# Patient Record
Sex: FEMALE | Race: WHITE | NOT HISPANIC OR LATINO | Employment: FULL TIME | URBAN - NONMETROPOLITAN AREA
[De-identification: names, ages, dates, MRNs, and addresses within clinical notes are randomized per-mention and may not be internally consistent; named-entity substitution may affect disease eponyms.]

---

## 2023-05-01 DIAGNOSIS — R10.9 UNSPECIFIED ABDOMINAL PAIN: ICD-10-CM

## 2023-05-01 RX ORDER — NALOXONE HYDROCHLORIDE 4 MG/.1ML
4 SPRAY NASAL
COMMUNITY
Start: 2022-12-15 | End: 2024-05-22 | Stop reason: ALTCHOICE

## 2023-05-01 RX ORDER — OMEPRAZOLE 20 MG/1
CAPSULE, DELAYED RELEASE ORAL
Qty: 30 CAPSULE | Refills: 2 | Status: SHIPPED | OUTPATIENT
Start: 2023-05-01 | End: 2024-05-22 | Stop reason: ALTCHOICE

## 2023-05-01 RX ORDER — LEVONORGESTREL/ETHIN.ESTRADIOL 0.1-0.02MG
1 TABLET ORAL DAILY
COMMUNITY

## 2023-05-01 RX ORDER — FLUTICASONE PROPIONATE 50 MCG
SPRAY, SUSPENSION (ML) NASAL
COMMUNITY
End: 2024-05-22 | Stop reason: ALTCHOICE

## 2023-05-01 RX ORDER — LIDOCAINE HYDROCHLORIDE 20 MG/ML
SOLUTION ORAL; TOPICAL
COMMUNITY
Start: 2022-12-15 | End: 2024-05-22 | Stop reason: ALTCHOICE

## 2023-05-01 RX ORDER — OMEPRAZOLE 20 MG/1
20 CAPSULE, DELAYED RELEASE ORAL DAILY
COMMUNITY
End: 2024-05-22 | Stop reason: ALTCHOICE

## 2023-07-21 ENCOUNTER — APPOINTMENT (OUTPATIENT)
Dept: PRIMARY CARE | Facility: CLINIC | Age: 23
End: 2023-07-21
Payer: COMMERCIAL

## 2024-05-15 NOTE — PROGRESS NOTES
"May Richardson is a 23 y.o. female who presents for Annual Exam (Keeps getting horrible headaches that last 2-3 days in a row, nothing gives her relief, she's getting them weekly; would like tested for thyroid issues; grandmother recently dx with colon cancer, gr.grandmother also had it )    Migraines: Worse in the last 4 to 6 months. OTC meds are not helping: aspirin, tylenol, aleve, excedrine, and prescription strength NSAIDs. She is working with Dr. Whitten now, she got new glasses and HA got better for a week. Previously with 2 to 5 HA days per month. Now having about 6 to 8 HA days per month. When she gets HA they last a couple of days. No caffeine intake. Not taking ibuprofen for anything apart from HA.     Abnormal weight gain: No irregular periods. She has gained about 30lb since her surgery last year.     Fhx colon cancer: Great grandmother had colon cancer and her grandmother was also recently diagnosed with colon cancer. She had C-scope in 2021 that was negative, unsure when next due.      All other systems have been reviewed and are negative for complaint     Objective   /90 (BP Location: Left arm, Patient Position: Sitting, BP Cuff Size: Adult)   Pulse 77   Ht 1.626 m (5' 4\")   Wt 87.1 kg (192 lb)   BMI 32.96 kg/m²     Gen: No acute distress, alert and oriented x3, pleasant   HEENT: moist mucous membranes, b/l external auditory canals are clear of debris, TMs within normal limits, no oropharyngeal lesions, eomi, perrla   Neck: thyroid within normal limits, no lymphadenopathy   CV: RRR, normal S1/S2, no murmur   Resp: Clear to auscultation bilaterally, no wheezes or rhonchi appreciated  Abd: soft, nontender, non-distended, no guarding/rigidity, bowel sounds present  Extr: no edema, no calf tenderness  Derm: Skin is warm and dry, no rashes appreciated  Psych: mood is good, affect is congruent, good hygiene, normal speech and eye contact  Neuro: cranial nerves grossly intact, normal " gait    Assessment/Plan     #Migraines  #Abnormal weight gain  Check labs  Rx sent sumatriptan for pnr use  Nurtec samples given   Followup 6 weeks     HCM:  Last C-scope was 3y ago, Strong Fhx  Referring for C-scope today  Declining flu shot

## 2024-05-22 ENCOUNTER — OFFICE VISIT (OUTPATIENT)
Dept: PRIMARY CARE | Facility: CLINIC | Age: 24
End: 2024-05-22
Payer: MEDICARE

## 2024-05-22 ENCOUNTER — LAB (OUTPATIENT)
Dept: LAB | Facility: LAB | Age: 24
End: 2024-05-22
Payer: MEDICARE

## 2024-05-22 VITALS
HEIGHT: 64 IN | DIASTOLIC BLOOD PRESSURE: 90 MMHG | WEIGHT: 192 LBS | HEART RATE: 77 BPM | SYSTOLIC BLOOD PRESSURE: 130 MMHG | BODY MASS INDEX: 32.78 KG/M2

## 2024-05-22 DIAGNOSIS — G43.809 OTHER MIGRAINE WITHOUT STATUS MIGRAINOSUS, NOT INTRACTABLE: ICD-10-CM

## 2024-05-22 DIAGNOSIS — R63.5 ABNORMAL WEIGHT GAIN: ICD-10-CM

## 2024-05-22 DIAGNOSIS — R63.5 ABNORMAL WEIGHT GAIN: Primary | ICD-10-CM

## 2024-05-22 LAB
ALBUMIN SERPL BCP-MCNC: 4.5 G/DL (ref 3.4–5)
ALP SERPL-CCNC: 67 U/L (ref 33–110)
ALT SERPL W P-5'-P-CCNC: 28 U/L (ref 7–45)
ANION GAP SERPL CALC-SCNC: 11 MMOL/L (ref 10–20)
AST SERPL W P-5'-P-CCNC: 23 U/L (ref 9–39)
BASOPHILS # BLD AUTO: 0.02 X10*3/UL (ref 0–0.1)
BASOPHILS NFR BLD AUTO: 0.2 %
BILIRUB SERPL-MCNC: 0.4 MG/DL (ref 0–1.2)
BUN SERPL-MCNC: 12 MG/DL (ref 6–23)
CALCIUM SERPL-MCNC: 9.2 MG/DL (ref 8.6–10.3)
CHLORIDE SERPL-SCNC: 104 MMOL/L (ref 98–107)
CO2 SERPL-SCNC: 26 MMOL/L (ref 21–32)
CREAT SERPL-MCNC: 0.79 MG/DL (ref 0.5–1.05)
EGFRCR SERPLBLD CKD-EPI 2021: >90 ML/MIN/1.73M*2
EOSINOPHIL # BLD AUTO: 0.07 X10*3/UL (ref 0–0.7)
EOSINOPHIL NFR BLD AUTO: 0.7 %
ERYTHROCYTE [DISTWIDTH] IN BLOOD BY AUTOMATED COUNT: 12.3 % (ref 11.5–14.5)
EST. AVERAGE GLUCOSE BLD GHB EST-MCNC: 105 MG/DL
FSH SERPL-ACNC: 1.8 IU/L
GLUCOSE SERPL-MCNC: 85 MG/DL (ref 74–99)
HBA1C MFR BLD: 5.3 %
HCT VFR BLD AUTO: 41.4 % (ref 36–46)
HGB BLD-MCNC: 13.9 G/DL (ref 12–16)
IMM GRANULOCYTES # BLD AUTO: 0.03 X10*3/UL (ref 0–0.7)
IMM GRANULOCYTES NFR BLD AUTO: 0.3 % (ref 0–0.9)
LH SERPL-ACNC: 1.2 IU/L
LYMPHOCYTES # BLD AUTO: 1.84 X10*3/UL (ref 1.2–4.8)
LYMPHOCYTES NFR BLD AUTO: 18.4 %
MCH RBC QN AUTO: 31.9 PG (ref 26–34)
MCHC RBC AUTO-ENTMCNC: 33.6 G/DL (ref 32–36)
MCV RBC AUTO: 95 FL (ref 80–100)
MONOCYTES # BLD AUTO: 0.51 X10*3/UL (ref 0.1–1)
MONOCYTES NFR BLD AUTO: 5.1 %
NEUTROPHILS # BLD AUTO: 7.51 X10*3/UL (ref 1.2–7.7)
NEUTROPHILS NFR BLD AUTO: 75.3 %
NRBC BLD-RTO: 0 /100 WBCS (ref 0–0)
PLATELET # BLD AUTO: 319 X10*3/UL (ref 150–450)
POTASSIUM SERPL-SCNC: 3.7 MMOL/L (ref 3.5–5.3)
PROT SERPL-MCNC: 7.9 G/DL (ref 6.4–8.2)
RBC # BLD AUTO: 4.36 X10*6/UL (ref 4–5.2)
SODIUM SERPL-SCNC: 137 MMOL/L (ref 136–145)
TSH SERPL-ACNC: 3.4 MIU/L (ref 0.44–3.98)
WBC # BLD AUTO: 10 X10*3/UL (ref 4.4–11.3)

## 2024-05-22 PROCEDURE — 83001 ASSAY OF GONADOTROPIN (FSH): CPT

## 2024-05-22 PROCEDURE — 83036 HEMOGLOBIN GLYCOSYLATED A1C: CPT

## 2024-05-22 PROCEDURE — 83002 ASSAY OF GONADOTROPIN (LH): CPT

## 2024-05-22 PROCEDURE — 80053 COMPREHEN METABOLIC PANEL: CPT

## 2024-05-22 PROCEDURE — 85025 COMPLETE CBC W/AUTO DIFF WBC: CPT

## 2024-05-22 PROCEDURE — 36415 COLL VENOUS BLD VENIPUNCTURE: CPT

## 2024-05-22 PROCEDURE — 99214 OFFICE O/P EST MOD 30 MIN: CPT | Performed by: FAMILY MEDICINE

## 2024-05-22 PROCEDURE — 84443 ASSAY THYROID STIM HORMONE: CPT

## 2024-05-22 RX ORDER — SUMATRIPTAN SUCCINATE 25 MG/1
25 TABLET ORAL ONCE AS NEEDED
Qty: 9 TABLET | Refills: 1 | Status: SHIPPED | OUTPATIENT
Start: 2024-05-22

## 2024-05-22 NOTE — PATIENT INSTRUCTIONS
Gastroenterology:  Juan A Rivera ([facility]) [phone]  Juan A Oliveira ([facility]) [phone]

## 2024-06-07 DIAGNOSIS — G43.809 OTHER MIGRAINE WITHOUT STATUS MIGRAINOSUS, NOT INTRACTABLE: Primary | ICD-10-CM

## 2024-06-07 DIAGNOSIS — H54.7 VISION LOSS: ICD-10-CM

## 2024-06-24 NOTE — PROGRESS NOTES
"Subjective   Patient ID: May Richardson is a 24 y.o. female who presents for Follow-up (6 weeks- migraines and weight gain- sumatriptan not helpful, she gets hot and flushed about 20 minutes after taking, did not try Nurtec; stopped taking OCP).    Migraines: Worse in the last 4 to 6 months. OTC meds are not helping: aspirin, tylenol, aleve, excedrine, and prescription strength NSAIDs. She is working with Dr. Whitten now, she got new glasses and HA got better for a week. Previously with 2 to 5 HA days per month. Now having about 6 to 8 HA days per month. When she gets HA they last a couple of days. No caffeine intake. Not taking ibuprofen for anything apart from HA.     She tried sumatriptan out numerous times but it caused hot flashes, sweating, redness without any improvement in the headaches. Never tried nurtec due to concerns about her insurance. Completed MRI brain.      Abnormal weight gain: No irregular periods. She has gained about 30lb since her surgery last year.      Fhx colon cancer: Great grandmother had colon cancer and her grandmother was also recently diagnosed with colon cancer. She had C-scope in 2021 that was negative, unsure when next due.      All other systems have been reviewed and are negative for complaint     Objective   /78 (BP Location: Left arm, Patient Position: Sitting, BP Cuff Size: Adult)   Pulse 75   Ht 1.626 m (5' 4\")   Wt 89.8 kg (198 lb)   BMI 33.99 kg/m²     Gen: No acute distress, alert and oriented x3, pleasant   HEENT: moist mucous membranes, b/l external auditory canals are clear of debris, TMs within normal limits, no oropharyngeal lesions, eomi, perrla   Neck: thyroid within normal limits, no lymphadenopathy   CV: RRR, normal S1/S2, no murmur   Resp: Clear to auscultation bilaterally, no wheezes or rhonchi appreciated  Abd: soft, nontender, non-distended, no guarding/rigidity, bowel sounds present  Extr: no edema, no calf tenderness  Derm: Skin is warm and " dry, no rashes appreciated  Psych: mood is good, affect is congruent, good hygiene, normal speech and eye contact  Neuro: cranial nerves grossly intact, normal gait    Assessment/Plan   #Migraines  #Abnormal weight gain  Labs neg, MRI brain reinforces migraine dx  SE's on sumatriptan, trial rizatriptan  Nurte samples given   Rx sent topiramate  Followup 2 mo     HCM:  Last C-scope was 3y ago, Strong Fhx  Referring for C-scope today  Declining flu shot

## 2024-06-26 ENCOUNTER — HOSPITAL ENCOUNTER (OUTPATIENT)
Dept: RADIOLOGY | Facility: HOSPITAL | Age: 24
Discharge: HOME | End: 2024-06-26
Payer: MEDICARE

## 2024-06-26 DIAGNOSIS — G43.809 OTHER MIGRAINE WITHOUT STATUS MIGRAINOSUS, NOT INTRACTABLE: ICD-10-CM

## 2024-06-26 DIAGNOSIS — H54.7 VISION LOSS: ICD-10-CM

## 2024-06-26 PROCEDURE — 70551 MRI BRAIN STEM W/O DYE: CPT

## 2024-07-01 ENCOUNTER — APPOINTMENT (OUTPATIENT)
Dept: PRIMARY CARE | Facility: CLINIC | Age: 24
End: 2024-07-01
Payer: MEDICARE

## 2024-07-01 VITALS
SYSTOLIC BLOOD PRESSURE: 111 MMHG | HEIGHT: 64 IN | HEART RATE: 75 BPM | WEIGHT: 198 LBS | BODY MASS INDEX: 33.8 KG/M2 | DIASTOLIC BLOOD PRESSURE: 78 MMHG

## 2024-07-01 DIAGNOSIS — G43.809 OTHER MIGRAINE WITHOUT STATUS MIGRAINOSUS, NOT INTRACTABLE: Primary | ICD-10-CM

## 2024-07-01 PROCEDURE — 99214 OFFICE O/P EST MOD 30 MIN: CPT | Performed by: FAMILY MEDICINE

## 2024-07-01 PROCEDURE — 1036F TOBACCO NON-USER: CPT | Performed by: FAMILY MEDICINE

## 2024-07-01 RX ORDER — TOPIRAMATE 50 MG/1
50 TABLET, FILM COATED ORAL 2 TIMES DAILY
Qty: 60 TABLET | Refills: 5 | Status: SHIPPED | OUTPATIENT
Start: 2024-07-01 | End: 2024-12-28

## 2024-07-01 RX ORDER — RIZATRIPTAN BENZOATE 5 MG/1
5 TABLET ORAL ONCE AS NEEDED
Qty: 9 TABLET | Refills: 0 | Status: SHIPPED | OUTPATIENT
Start: 2024-07-01 | End: 2024-07-31

## 2024-07-12 ENCOUNTER — APPOINTMENT (OUTPATIENT)
Dept: OTOLARYNGOLOGY | Facility: CLINIC | Age: 24
End: 2024-07-12
Payer: MEDICARE

## 2024-07-15 ENCOUNTER — APPOINTMENT (OUTPATIENT)
Dept: OTOLARYNGOLOGY | Facility: CLINIC | Age: 24
End: 2024-07-15
Payer: MEDICARE

## 2024-07-15 DIAGNOSIS — J34.2 DEVIATED NASAL SEPTUM: Primary | ICD-10-CM

## 2024-07-15 DIAGNOSIS — J34.1 MAXILLARY SINUS CYST: ICD-10-CM

## 2024-07-15 DIAGNOSIS — J34.89 CONCHA BULLOSA: ICD-10-CM

## 2024-07-15 DIAGNOSIS — J34.3 NASAL TURBINATE HYPERTROPHY: ICD-10-CM

## 2024-07-15 PROCEDURE — 99214 OFFICE O/P EST MOD 30 MIN: CPT | Performed by: OTOLARYNGOLOGY

## 2024-07-15 RX ORDER — FLUTICASONE PROPIONATE 50 MCG
1 SPRAY, SUSPENSION (ML) NASAL 2 TIMES DAILY
Qty: 16 G | Refills: 1 | Status: SHIPPED | OUTPATIENT
Start: 2024-07-15 | End: 2025-07-15

## 2024-07-15 NOTE — PROGRESS NOTES
Diagnoses/Problems     · Postoperative examination (V67.00) (Z09)     Patient Discussion/Summary     01.17.2023: She had tonsillectomy on 12.15.2022. Comes for follow up. She is doing good, her recovery was fast. On examination she has well-healed partial tonsillectomy. There are three visible retained dissolvable sutures 2 on the right and 1 on the left side. I think they can come out on their own, no need to remove them.        Recommendation  1â€“follow-up as needed     ____________________________________________________________________________________________     May is a 23 yo F. She is referred by Dr. Price. For the past one year, she keeps getting sinus and throat infections. She has used multiple rounds of antibiotics and steroids. She missed lots of work days.      On examination, tonsils look big for age.  I have explained to her that she can go for surgery, or she could go for further testing for possible immune deficiency. She does not want to go for surgery as of now, and would like to research possible immunity related problems.     Plan:  1- Follow up with Dr. Price regarding already done blood work.  2- Referral to immunology for recurrent sore throat and sinus infections      Chief Complaint     follow up tonsillectomy      Adult Risk ScreeningAdult Risk Screening_[facility]:   Initial Fall Risk Screening: The patient is not using an assistive device.             History of Present Illness  7.15.24  ______________________________________________________________________    01.17.2023: She had tonsillectomy on 12.15.2022. Comes for follow up. She is doing good, her recovery was fast. On examination she has well-healed partial tonsillectomy. There are three visible retained dissolvable sutures 2 on the right and 1 on the left side. I think they can come out on their own, no need to remove them.        Recommendation  1â€“follow-up as needed      ____________________________________________________________________________________________     May is a 23 yo F. She is referred by Dr. Price. For the past one year, she keeps getting sinus and throat infections. She has used multiple rounds of antibiotics and steroids. She missed lots of work days.      On examination, tonsils look big for age.  I have explained to her that she can go for surgery, or she could go for further testing for possible immune deficiency. She does not want to go for surgery as of now, and would like to research possible immunity related problems.     Plan:  1- Follow up with Dr. Price regarding already done blood work.  2- Referral to immunology for recurrent sore throat and sinus infections      Review of Systems  Below is a copy of her initial ROS, she does not have fever today.           ENMT: postnasal drip.   Respiratory: coughing up sputum.        Physical Exam  (old exam note)  General appearance: Healthy-appearing, well-nourished, well groomed, in no acute distress.      Head and Face: Atraumatic with no masses, lesions, or scarring.      Salivary glands: No tenderness of the parotid glands or parotid masses.      No tenderness of the submandibular glands or submandibular masses.      Facial strength: Normal strength and symmetry, no synkinesis or facial tic.      Eyes: Conjunctivas look non-hyperemic bilaterally     Ears: Bilaterally ear canals look normal. Tympanic membranes intact, no hyperemia, fluid or retraction.      Nose: Mucosa looks normal. No purulent discharge.     Oral Cavity/Mouth: Lips and tongue look normal.      Throat: No postnasal discharge. Tonsil hypertrophy for age.      Neck: Symmetrical, trachea midline.      Pulmonary: Normal respiratory effort.      Lymphatic: No palpable pathologic lymph nodes at neck.      Neurological/Psychiatric: Orientation to person, place, and time: Normal.   Mood and affect: Normal.      Extremities: No clubbing.          Signatures   Electronically signed by : Juliana Martin MD; Jan 17 2023 10:01AM EST (Author)

## 2024-07-15 NOTE — PROGRESS NOTES
Chief Complaint     Sinus cyst     History of Present Illness    07.15.2024: She has difficulty with breathing out of left side of her nose. Her nose gets stuffy at night. She has postnasal discharge. She has headaches for the past 6 months. There is tenderness at right maxillary ethmoid sinuses. She had an MRI, incidentally a cyst was found close to the outflow tract of right maxillary sinus. 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 Enlarged inferior turbinates.    On examination, nasal septum is markedly deviated to left, narrowing left nasal cavity    Dx:  1- septum deviation  2- jere bullosa  3- Maxillary sinus cyst     Plan:  1- trial of fluticasone nasal spray  2- follow up in one month  3- consider septoplasty, reduction of right jere bullosa and removal of right maxillary sinus cyst.        _________________________________________________________________    01.17.2023: She had tonsillectomy on 12.15.2022. Comes for follow up. She is doing good, her recovery was fast. On examination she has well-healed partial tonsillectomy. There are three visible retained dissolvable sutures 2 on the right and 1 on the left side. I think they can come out on their own, no need to remove them.        Recommendation  1â€“follow-up as needed     ____________________________________________________________________________________________     May is a 21 yo F. She is referred by Dr. Price. For the past one year, she keeps getting sinus and throat infections. She has used multiple rounds of antibiotics and steroids. She missed lots of work days.      On examination, tonsils look big for age.  I have explained to her that she can go for surgery, or she could go for further testing for possible immune deficiency. She does not want to go for surgery as of now, and would like to research possible immunity related problems.     Plan:  1- Follow up with Dr. Price regarding already done  blood work.  2- Referral to immunology for recurrent sore throat and sinus infections      Review of Systems  Below is a copy of her initial ROS, she does not have fever today.           ENMT: postnasal drip.   Respiratory: coughing up sputum.   All other systems have been reviewed and are negative for complaint.      Active Problems     · Abdominal pain (789.00) (R10.9)   · Abnormal thyroid function test (794.5) (R94.6)   · Abnormal weight gain (783.1) (R63.5)   · Acute sinusitis, recurrence not specified, unspecified location (461.9) (J01.90)   · Bronchitis (490) (J40)   · Class 1 obesity with body mass index (BMI) of 30.0 to 30.9 in adult (278.00,V85.30)  (E66.9,Z68.30)   · COVID-19 (079.89) (U07.1)   · Diarrhea (787.91) (R19.7)   · Exertional chest pain (786.50) (R07.9)   · Exertional shortness of breath (786.05) (R06.02)   · Fatigue (780.79) (R53.83)   · Gastritis (535.50) (K29.70)   · Injury of left knee, initial encounter (959.7) (S89.92XA)   · Nausea in adult (787.02) (R11.0)   · Nonintractable headache, unspecified chronicity pattern, unspecified headache type  (784.0) (R51.9)   · Recurrent sinusitis (473.9) (J32.9)   · Recurrent tonsillitis (463) (J03.91)   · Screening for hyperlipidemia (V77.91) (Z13.220)   · Spasmodic torticollis (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.83) (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.3)   · Tonsillitis (463) (J03.90)   · Unexplained weight loss (783.21) (R63.4)   · Upper respiratory infection (465.9) (J06.9)   · Added by Problem List Migration; 2013-3-24; Moved to [facility] Nov 25 2013 9:34PM     Past Medical History     · History of Acute maxillary sinusitis (461.0) (J01.00)   · Resolved Date: 23 Mar 2016   · Acute tonsillitis (463) (J03.90)   · Resolved Date: 05 Aug 2013   · Acute tonsillitis (463) (J03.90)   · Resolved Date: 19 Aug 2013   · History of Cough (786.2) (R05.9)   · Resolved Date: 12 Jun 2015   · History of Fever of unknown origin (FUO) (780.60) (R50.9)   · Resolved Date: 23 Mar 2016   · History of backache (V13.59) (Z87.39)   ·  Resolved Date: 23 Mar 2016   · History of epistaxis (V12.69) (Z87.898)   · Resolved Date: 30 Jul 2015   · History of pharyngitis (V12.69) (Z87.09)   · Resolved Date: 12 Jun 2015   · Added by Problem List Migration; 2013-3-24; Moved to [facility] Nov 25 2013 9:34PM   · History of Nasal congestion (478.19) (R09.81)   · Resolved Date: 30 Jul 2015   · History of Sore throat (462) (J02.9)   · Resolved Date: 23 Mar 2016   · History of Vasovagal syncope (780.2) (R55)   · Resolved Date: 12 Jun 2015     Surgical History     · History of Umbilical Hernia Repair     Family History     · Family history of cardiac disorder (V17.49) (Z82.49)     · Family history of cardiac disorder (V17.49) (Z82.49)     Social History     · Never smoker   · No alcohol use     Allergies     · No Known Drug Allergies   Recorded By: Ally Montenegro; 5/18/2013 11:16:30 AM     · [place]rees   Recorded By: Liberty Larsen; 9/12/2022 11:40:02 AM     Current Meds     Medication Name Instruction   Fluticasone Propionate 50 MCG/ACT Nasal Suspension SPRAY 1 SPRAY INTO EACH NOSTRIL EVERY DAY   Mucinex 600 MG Oral Tablet Extended Release 12 Hour TAKE 1 TABLET EVERY 12 HOURS.   Omeprazole 20 MG Oral Capsule Delayed Release TAKE 1 CAPSULE BY MOUTH DAILY      Physical Exam  (old exam note)  General appearance: Healthy-appearing, well-nourished, well groomed, in no acute distress.      Head and Face: Atraumatic with no masses, lesions, or scarring.      Salivary glands: No tenderness of the parotid glands or parotid masses.      No tenderness of the submandibular glands or submandibular masses.      Facial strength: Normal strength and symmetry, no synkinesis or facial tic.      Eyes: Conjunctivas look non-hyperemic bilaterally     Ears: Bilaterally ear canals look normal. Tympanic membranes intact, no hyperemia, fluid or retraction.      Nose: Mucosa looks normal. No purulent discharge.     Oral Cavity/Mouth: Lips and tongue look normal.      Throat: No postnasal  discharge. Tonsil hypertrophy for age.      Neck: Symmetrical, trachea midline.      Pulmonary: Normal respiratory effort.      Lymphatic: No palpable pathologic lymph nodes at neck.      Neurological/Psychiatric: Orientation to person, place, and time: Normal.   Mood and affect: Normal.      Extremities: No clubbing.       Patient Discussion/Summary     07.15.2024: She has difficulty with breathing out of left side of her nose. Her nose gets stuffy at night. She has postnasal discharge. She has headaches for the past 6 months. There is tenderness at right maxillary ethmoid sinuses. She had an MRI, incidentally a cyst was found close to the outflow tract of right maxillary sinus. 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 Enlarged inferior turbinates.    On examination, nasal septum is markedly deviated to left, narrowing left nasal cavity    Dx:  1- septum deviation  2- jere bullosa  3- Maxillary sinus cyst     Plan:  1- trial of fluticasone nasal spray  2- follow up in one month  3- consider septoplasty, reduction of right jere bullosa and removal of right maxillary sinus cyst.  _________________________________________________________________    01.17.2023: She had tonsillectomy on 12.15.2022. Comes for follow up. She is doing good, her recovery was fast. On examination she has well-healed partial tonsillectomy. There are three visible retained dissolvable sutures 2 on the right and 1 on the left side. I think they can come out on their own, no need to remove them.        Recommendation  1â€“follow-up as needed     ____________________________________________________________________________________________     May is a 23 yo F. She is referred by Dr. Price. For the past one year, she keeps getting sinus and throat infections. She has used multiple rounds of antibiotics and steroids. She missed lots of work days.      On examination, tonsils look big for age.  I have explained  to her that she can go for surgery, or she could go for further testing for possible immune deficiency. She does not want to go for surgery as of now, and would like to research possible immunity related problems.     Plan:  1- Follow up with Dr. Price regarding already done blood work.  2- Referral to immunology for recurrent sore throat and sinus infections

## 2024-07-29 DIAGNOSIS — G43.809 OTHER MIGRAINE WITHOUT STATUS MIGRAINOSUS, NOT INTRACTABLE: ICD-10-CM

## 2024-07-29 RX ORDER — RIZATRIPTAN BENZOATE 5 MG/1
5 TABLET ORAL ONCE AS NEEDED
Qty: 9 TABLET | Refills: 0 | Status: SHIPPED | OUTPATIENT
Start: 2024-07-29 | End: 2024-08-28

## 2024-08-07 DIAGNOSIS — J34.2 DEVIATED NASAL SEPTUM: ICD-10-CM

## 2024-08-07 DIAGNOSIS — J34.3 NASAL TURBINATE HYPERTROPHY: ICD-10-CM

## 2024-08-07 DIAGNOSIS — J34.89 CONCHA BULLOSA: ICD-10-CM

## 2024-08-07 DIAGNOSIS — J34.1 MAXILLARY SINUS CYST: ICD-10-CM

## 2024-08-08 RX ORDER — FLUTICASONE PROPIONATE 50 MCG
SPRAY, SUSPENSION (ML) NASAL
Qty: 48 ML | Refills: 1 | Status: SHIPPED | OUTPATIENT
Start: 2024-08-08

## 2024-09-09 ENCOUNTER — LAB (OUTPATIENT)
Dept: LAB | Facility: LAB | Age: 24
End: 2024-09-09
Payer: MEDICARE

## 2024-09-09 ENCOUNTER — APPOINTMENT (OUTPATIENT)
Dept: PRIMARY CARE | Facility: CLINIC | Age: 24
End: 2024-09-09
Payer: MEDICARE

## 2024-09-09 VITALS
DIASTOLIC BLOOD PRESSURE: 86 MMHG | SYSTOLIC BLOOD PRESSURE: 123 MMHG | BODY MASS INDEX: 34.31 KG/M2 | WEIGHT: 201 LBS | HEIGHT: 64 IN | HEART RATE: 101 BPM

## 2024-09-09 DIAGNOSIS — R94.6 ABNORMAL THYROID FUNCTION TEST: ICD-10-CM

## 2024-09-09 DIAGNOSIS — G43.811 OTHER MIGRAINE WITH STATUS MIGRAINOSUS, INTRACTABLE: Primary | ICD-10-CM

## 2024-09-09 PROBLEM — J44.9 CHRONIC OBSTRUCTIVE PULMONARY DISEASE, UNSPECIFIED COPD TYPE (MULTI): Status: ACTIVE | Noted: 2024-09-09

## 2024-09-09 PROBLEM — E11.9 TYPE 2 DIABETES MELLITUS WITHOUT COMPLICATION, WITHOUT LONG-TERM CURRENT USE OF INSULIN (MULTI): Status: ACTIVE | Noted: 2024-09-09

## 2024-09-09 LAB
T4 FREE SERPL-MCNC: 0.71 NG/DL (ref 0.61–1.12)
TSH SERPL-ACNC: 5.02 MIU/L (ref 0.44–3.98)

## 2024-09-09 PROCEDURE — 3008F BODY MASS INDEX DOCD: CPT | Performed by: FAMILY MEDICINE

## 2024-09-09 PROCEDURE — 3074F SYST BP LT 130 MM HG: CPT | Performed by: FAMILY MEDICINE

## 2024-09-09 PROCEDURE — 84439 ASSAY OF FREE THYROXINE: CPT

## 2024-09-09 PROCEDURE — 84481 FREE ASSAY (FT-3): CPT

## 2024-09-09 PROCEDURE — 99214 OFFICE O/P EST MOD 30 MIN: CPT | Performed by: FAMILY MEDICINE

## 2024-09-09 PROCEDURE — 3044F HG A1C LEVEL LT 7.0%: CPT | Performed by: FAMILY MEDICINE

## 2024-09-09 PROCEDURE — 84443 ASSAY THYROID STIM HORMONE: CPT

## 2024-09-09 PROCEDURE — 1036F TOBACCO NON-USER: CPT | Performed by: FAMILY MEDICINE

## 2024-09-09 PROCEDURE — 86376 MICROSOMAL ANTIBODY EACH: CPT

## 2024-09-09 PROCEDURE — 3079F DIAST BP 80-89 MM HG: CPT | Performed by: FAMILY MEDICINE

## 2024-09-09 PROCEDURE — 36415 COLL VENOUS BLD VENIPUNCTURE: CPT

## 2024-09-09 RX ORDER — KETOROLAC TROMETHAMINE 10 MG/1
10 TABLET, FILM COATED ORAL DAILY
Qty: 5 TABLET | Refills: 0 | Status: SHIPPED | OUTPATIENT
Start: 2024-09-09 | End: 2024-10-09

## 2024-09-09 RX ORDER — AMITRIPTYLINE HYDROCHLORIDE 25 MG/1
25 TABLET, FILM COATED ORAL NIGHTLY
Qty: 30 TABLET | Refills: 11 | Status: SHIPPED | OUTPATIENT
Start: 2024-09-09 | End: 2025-09-09

## 2024-09-09 NOTE — PROGRESS NOTES
"Subjective   Patient ID: May Richardson is a 24 y.o. female who presents for Follow-up (2 months follow up for migraines; not getting them as often but the intensity is the same when she does get them ).    Migraines: Worse in the last 4 to 6 months. OTC meds are not helping: aspirin, tylenol, aleve, excedrine, and prescription strength NSAIDs. She is working with Dr. Whitten now, she got new glasses and HA got better for a week. Previously with 2 to 5 HA days per month. Now having about 6 to 8 HA days per month. When she gets HA they last a couple of days. No caffeine intake. Not taking ibuprofen for anything apart from HA. She tried sumatriptan out numerous times but it caused hot flashes, sweating, redness without any improvement in the headaches. Never tried nurtec due to concerns about her insurance. Completed MRI brain.     Update: HA are still lasting 4 to 5 days. She is having HA every 2 weeks. Mom has been wanting to take her to the ER intermittently because of the severity of the migraines. Currently having 15 to 20 HA days per month. She tried rizatriptan, it did not help. No SE's. She tried nurtec and it did not help either (no SE's). She has been taking topiramate, she has been dealing with some minimal tingling that has been tolerable.      Abnormal weight gain: No irregular periods. She has gained about 30lb since her surgery last year.      Fhx colon cancer: Great grandmother had colon cancer and her grandmother was also recently diagnosed with colon cancer. She had C-scope in 2021 that was negative, unsure when next due.      All other systems have been reviewed and are negative for complaint     Objective   /86 (BP Location: Left arm, Patient Position: Sitting, BP Cuff Size: Adult)   Pulse 101   Ht 1.626 m (5' 4\")   Wt 91.2 kg (201 lb)   BMI 34.50 kg/m²     Gen: No acute distress, alert and oriented x3, pleasant   HEENT: moist mucous membranes, b/l external auditory canals are clear " of debris, TMs within normal limits, no oropharyngeal lesions, eomi, perrla   Neck: thyroid within normal limits, no lymphadenopathy   CV: RRR, normal S1/S2, no murmur   Resp: Clear to auscultation bilaterally, no wheezes or rhonchi appreciated  Abd: soft, nontender, non-distended, no guarding/rigidity, bowel sounds present  Extr: no edema, no calf tenderness  Derm: Skin is warm and dry, no rashes appreciated  Psych: mood is good, affect is congruent, good hygiene, normal speech and eye contact  Neuro: cranial nerves grossly intact, normal gait    Assessment/Plan     #Migraines  #Abnormal weight gain  Labs neg, MRI brain reinforces migraine dx  SE's on sumatriptan, rizatriptan and nurtec did not help  Topiramate did not work  Trial amitriptyline  Rx sent amitriptyline  Rx sent toradol  Ubrelvy sample given  Followup 6 weeks     HCM:  Last C-scope was 3y ago, Strong Fhx  Referring for C-scope today  Declining flu shot

## 2024-09-10 DIAGNOSIS — E06.3 HASHIMOTO'S THYROIDITIS: ICD-10-CM

## 2024-09-10 DIAGNOSIS — E03.9 HYPOTHYROIDISM, UNSPECIFIED TYPE: Primary | ICD-10-CM

## 2024-09-10 LAB
T3FREE SERPL-MCNC: 3 PG/ML (ref 2.3–4.2)
THYROPEROXIDASE AB SERPL-ACNC: >1000 IU/ML

## 2024-09-10 RX ORDER — LEVOTHYROXINE SODIUM 50 UG/1
50 TABLET ORAL DAILY
Qty: 30 TABLET | Refills: 11 | Status: SHIPPED | OUTPATIENT
Start: 2024-09-10 | End: 2025-09-10

## 2024-09-19 ENCOUNTER — OFFICE VISIT (OUTPATIENT)
Dept: URGENT CARE | Facility: URGENT CARE | Age: 24
End: 2024-09-19
Payer: MEDICARE

## 2024-09-19 ENCOUNTER — HOSPITAL ENCOUNTER (OUTPATIENT)
Dept: RADIOLOGY | Facility: CLINIC | Age: 24
Discharge: HOME | End: 2024-09-19
Payer: MEDICARE

## 2024-09-19 VITALS
HEART RATE: 80 BPM | BODY MASS INDEX: 35.04 KG/M2 | SYSTOLIC BLOOD PRESSURE: 125 MMHG | DIASTOLIC BLOOD PRESSURE: 88 MMHG | TEMPERATURE: 97.4 F | RESPIRATION RATE: 18 BRPM | OXYGEN SATURATION: 100 % | WEIGHT: 204.15 LBS

## 2024-09-19 DIAGNOSIS — M25.572 ACUTE LEFT ANKLE PAIN: Primary | ICD-10-CM

## 2024-09-19 DIAGNOSIS — S93.492A SPRAIN OF OTHER LIGAMENT OF LEFT ANKLE, INITIAL ENCOUNTER: ICD-10-CM

## 2024-09-19 DIAGNOSIS — M25.572 ACUTE LEFT ANKLE PAIN: ICD-10-CM

## 2024-09-19 PROCEDURE — 73610 X-RAY EXAM OF ANKLE: CPT | Mod: LT

## 2024-09-19 NOTE — PROGRESS NOTES
Subjective   Patient ID: May Richardson is a 24 y.o. female. They present today with a chief complaint of Injury ((L) ankle, fell downstairs and rolled ankle 2hrs ago).    History of Present Illness  HPI  Pt reports accidentally falling down 3 steps inverting her left foot while leaving work around 5pm; she was trying to turn the lights out while going down the steps causing her to lose her balance. She felt immediate pain and popping noises in lateral left ankle. Pt able to walk however with difficulty. No numbness.  Past Medical History  Allergies as of 09/19/2024 - Reviewed 09/19/2024   Allergen Reaction Noted    Pollen extracts Unknown 05/22/2024       (Not in a hospital admission)       Past Medical History:   Diagnosis Date    Acute maxillary sinusitis, unspecified 01/28/2016    Acute maxillary sinusitis    Acute pharyngitis, unspecified 01/20/2016    Sore throat    Acute tonsillitis, unspecified 07/22/2013    Acute tonsillitis    Acute tonsillitis, unspecified 08/05/2013    Acute tonsillitis    Cough, unspecified 08/05/2013    Cough    Fever, unspecified 09/23/2015    Fever of unknown origin (FUO)    Hypothyroidism due to Hashimoto's thyroiditis     Migraine headache     Nasal congestion 06/12/2015    Nasal congestion    Personal history of other diseases of the musculoskeletal system and connective tissue 09/21/2015    History of backache    Personal history of other diseases of the respiratory system 01/03/2015    History of pharyngitis    Personal history of other specified conditions 06/12/2015    History of epistaxis    Syncope and collapse 06/12/2015    Vasovagal syncope       Past Surgical History:   Procedure Laterality Date    UMBILICAL HERNIA REPAIR  05/08/2017    Umbilical Hernia Repair        reports that she has never smoked. She has never been exposed to tobacco smoke. She has never used smokeless tobacco.    Review of Systems  Review of Systems                               Objective     Vitals:    09/19/24 1925   BP: 125/88   BP Location: Left arm   Patient Position: Sitting   BP Cuff Size: Adult   Pulse: 80   Resp: 18   Temp: 36.3 °C (97.4 °F)   TempSrc: Oral   SpO2: 100%   Weight: 92.6 kg (204 lb 2.3 oz)     No LMP recorded.    Physical Exam  Vitals and nursing note reviewed.   Constitutional:       Appearance: Normal appearance.      Comments: Appears in discomfort   Musculoskeletal:      Comments: Pain in left foot with dorsiflexion, limited ROM due to pain. Point tender lateral left foot with swelling, tender over dorsal foot along 3rd-5th toes. Sensation intact to light touch, able to move toes.   Skin:     Capillary Refill: Capillary refill takes less than 2 seconds.   Neurological:      General: No focal deficit present.      Mental Status: She is alert and oriented to person, place, and time.         Procedures    Point of Care Test & Imaging Results from this visit  No results found for this visit on 09/19/24.   XR ankle left 3+ views    Result Date: 9/19/2024  Interpreted By:  Zeke Cole, STUDY: XR ANKLE LEFT 3+ VIEWS; ;  9/19/2024 7:21 pm   INDICATION: Signs/Symptoms:acute injury to left ankle.   ,M25.572 Pain in left ankle and joints of left foot   COMPARISON: None.   ACCESSION NUMBER(S): [id number]   ORDERING CLINICIAN: CAROLINA PERRY   FINDINGS: Left ankle, three views   There is no fracture. There is no dislocation. There are no degenerative changes. There is no lytic or sclerotic lesion. There is no soft tissue abnormality seen.       No acute abnormality in the left ankle     MACRO: None   Signed by: Zeke Cole 9/19/2024 7:50 PM Dictation workstation:   [id number]     Diagnostic study results (if any) were reviewed by Carolina Perry PA-C.    Assessment/Plan   Allergies, medications, history, and pertinent labs/EKGs/Imaging reviewed by Carolina Perry PA-C.     Orders and Diagnoses  Diagnoses and all orders for this visit:  Acute left ankle  pain  -     XR ankle left 3+ views; Future  -     Referral to Orthopaedic Surgery; Future  Sprain of other ligament of left ankle, initial encounter  -     Referral to Orthopaedic Surgery; Future  - Reviewed xray findings with pt, reviewed images personally. No acute fracture or dislocation. Concern for ligamentous injury due to pain and swelling on exam. Referral for Ortho placed, advised pt to call  to make appt for next week. Pt was placed in walking boot and declined crutches; weight bearing as tolerated. Advised ice compresses, elevate extremity above heart and pain control with Tylenol 500mg every 6h as needed. Go to ER if worsening pain or swelling.      Patient disposition: Home    Electronically signed by Amanda Verma PA-C  7:33 AM

## 2024-09-20 NOTE — PATIENT INSTRUCTIONS
Left ankle and foot sprain- wear boot and crutches until seen by ortho. Xray negative for fracture. Elevate above heart, ice 20 min every 2hr for 24 hours and pain control with tylenol as needed. Call  to make appt with ortho

## 2024-09-24 ENCOUNTER — HOSPITAL ENCOUNTER (OUTPATIENT)
Dept: RADIOLOGY | Facility: HOSPITAL | Age: 24
Discharge: HOME | End: 2024-09-24
Payer: MEDICARE

## 2024-09-24 ENCOUNTER — APPOINTMENT (OUTPATIENT)
Dept: ORTHOPEDIC SURGERY | Facility: CLINIC | Age: 24
End: 2024-09-24
Payer: MEDICARE

## 2024-09-24 VITALS — BODY MASS INDEX: 34.83 KG/M2 | HEIGHT: 64 IN | WEIGHT: 204 LBS

## 2024-09-24 DIAGNOSIS — S93.492A SPRAIN OF OTHER LIGAMENT OF LEFT ANKLE, INITIAL ENCOUNTER: ICD-10-CM

## 2024-09-24 DIAGNOSIS — M79.672 LEFT FOOT PAIN: Primary | ICD-10-CM

## 2024-09-24 DIAGNOSIS — M25.572 ACUTE LEFT ANKLE PAIN: ICD-10-CM

## 2024-09-24 DIAGNOSIS — M79.672 LEFT FOOT PAIN: ICD-10-CM

## 2024-09-24 PROCEDURE — 3044F HG A1C LEVEL LT 7.0%: CPT

## 2024-09-24 PROCEDURE — 99204 OFFICE O/P NEW MOD 45 MIN: CPT

## 2024-09-24 PROCEDURE — 1036F TOBACCO NON-USER: CPT

## 2024-09-24 PROCEDURE — 3008F BODY MASS INDEX DOCD: CPT

## 2024-09-24 PROCEDURE — 73630 X-RAY EXAM OF FOOT: CPT | Mod: LT

## 2024-09-24 PROCEDURE — 73630 X-RAY EXAM OF FOOT: CPT | Mod: LEFT SIDE | Performed by: RADIOLOGY

## 2024-09-24 ASSESSMENT — PAIN SCALES - GENERAL: PAINLEVEL_OUTOF10: 4

## 2024-09-24 ASSESSMENT — PAIN - FUNCTIONAL ASSESSMENT: PAIN_FUNCTIONAL_ASSESSMENT: 0-10

## 2024-09-24 ASSESSMENT — PAIN DESCRIPTION - DESCRIPTORS: DESCRIPTORS: SHARP;TIGHTNESS

## 2024-09-24 NOTE — PROGRESS NOTES
HPI  May Richardson is a 24 y.o. female  in office today for   Chief Complaint   Patient presents with    Left Ankle - Pain, Edema     9/19/24 miss a step and felt a pop!  Has pain with upward movement   swelling has gotten better   .  she staes she went to urgent care at the time, advised them that her foot also hurt, but only did ankle x-rays.  She arrived in a boot and with crutches.  She has been resting and has been able to do light duty at work that doesn't require standing.  Ice, compression.      Medication  Current Outpatient Medications on File Prior to Visit   Medication Sig Dispense Refill    amitriptyline (Elavil) 25 mg tablet Take 1 tablet (25 mg) by mouth once daily at bedtime. 30 tablet 11    fluticasone (Flonase) 50 mcg/actuation nasal spray USE 1 SPRAY INTO EACH NOSTRIL TWICE DAILY. SHAKE GENTLY. BEFORE 1ST USE, PRIME PUMP. AFTER USE, CLEAN TIP & REPLACE CAP. 48 mL 1    ketorolac (Toradol) 10 mg tablet Take 1 tablet (10 mg) by mouth once daily. 5 tablet 0    levonorgestreL-ethinyl estrad (Vienva) 0.1-20 mg-mcg tablet Take 1 tablet by mouth once daily.      levothyroxine (Synthroid, Levoxyl) 50 mcg tablet Take 1 tablet (50 mcg) by mouth early in the morning.. Take on an empty stomach at the same time each day, either 30 to 60 minutes prior to breakfast 30 tablet 11    rizatriptan (Maxalt) 5 mg tablet TAKE 1 TABLET (5 MG) BY MOUTH 1 TIME IF NEEDED FOR MIGRAINE. MAY REPEAT IN 2 HOURS IF UNRESOLVED. DO NOT EXCEED 30 MG IN 24 HOURS. 9 tablet 0    topiramate (Topamax) 50 mg tablet Take 1 tablet (50 mg) by mouth 2 times a day. 60 tablet 5     No current facility-administered medications on file prior to visit.       Physical Exam  Constitutional: well developed, well nourished female in no acute distress  Psychiatric: normal mood, appropriate affect  Eyes: sclera anicteric  HENT: normocephalic/atraumatic  CV: regular rate and rhythm   Respiratory: non labored breathing  Integumentary: no  rash  Neurological: moves all extremities    Left Ankle Exam     Tenderness   The patient is experiencing tenderness in the ATF and CF.   Swelling: mild    Range of Motion   Dorsiflexion:  10   Plantar flexion:  40   Eversion:  20   Inversion:  45     Muscle Strength   Dorsiflexion:  3/5   Plantar flexion:  4/5     Tests   Anterior drawer: negative  Varus tilt: negative    Other   Erythema: absent  Scars: absent            Imaging/Lab:  X-rays were taken 9/19/24 of the ankle which were reviewed by myself and read by radiology and show There is no fracture. There is no dislocation. There are no degenerative changes. There is no lytic or sclerotic lesion. There is no soft tissue abnormality seen.    X-rays of foot were completed today and reviewed by myself.  No acute fracture noted, but will review x-ray read once available.      Assessment  Assessment: left ankle sprain    Plan  Plan:  History, physical exam, and imaging were reviewed with patient. Patient to remain in boot and to use crutches as she continues to have pain.  As pain improves, can wean away from the crutches and then as ambulating improves, can wean out of boot.  Plan may change slightly if any foot fractures, but should use crutches and boot while pain.  Continue with RICE and antiinflammatories for pain and edema.  Follow Up: 2 weeks.  Will call once I see the x-ray read of the foot.    All questions were answered for the patient prior to end of exam and patient addressed their understanding.    Mary Nance PA-C  09/24/24

## 2024-09-26 ENCOUNTER — TELEPHONE (OUTPATIENT)
Dept: ORTHOPEDIC SURGERY | Facility: CLINIC | Age: 24
End: 2024-09-26
Payer: MEDICARE

## 2024-09-26 NOTE — TELEPHONE ENCOUNTER
I left a VM for the patient with the information below.   ----- Message from Mary Nance sent at 9/26/2024  8:09 AM EDT -----  Can you call her and let her know that there is no fracture in her foot.  We will continue to treat the ankle sprain.  ----- Message -----  From: Cesar, Radiology Results In  Sent: 9/25/2024   7:12 PM EDT  To: Mary Nance PA-C

## 2024-10-10 ENCOUNTER — APPOINTMENT (OUTPATIENT)
Dept: ORTHOPEDIC SURGERY | Facility: CLINIC | Age: 24
End: 2024-10-10
Payer: MEDICARE

## 2024-10-28 ENCOUNTER — LAB (OUTPATIENT)
Dept: LAB | Facility: LAB | Age: 24
End: 2024-10-28
Payer: MEDICARE

## 2024-10-28 ENCOUNTER — APPOINTMENT (OUTPATIENT)
Dept: PRIMARY CARE | Facility: CLINIC | Age: 24
End: 2024-10-28
Payer: MEDICARE

## 2024-10-28 VITALS
WEIGHT: 210 LBS | SYSTOLIC BLOOD PRESSURE: 129 MMHG | BODY MASS INDEX: 35.85 KG/M2 | HEIGHT: 64 IN | DIASTOLIC BLOOD PRESSURE: 83 MMHG | HEART RATE: 121 BPM

## 2024-10-28 DIAGNOSIS — G43.809 OTHER MIGRAINE WITHOUT STATUS MIGRAINOSUS, NOT INTRACTABLE: ICD-10-CM

## 2024-10-28 DIAGNOSIS — R94.6 ABNORMAL THYROID FUNCTION TEST: ICD-10-CM

## 2024-10-28 DIAGNOSIS — G43.809 OTHER MIGRAINE WITHOUT STATUS MIGRAINOSUS, NOT INTRACTABLE: Primary | ICD-10-CM

## 2024-10-28 LAB — TSH SERPL-ACNC: 2.38 MIU/L (ref 0.44–3.98)

## 2024-10-28 PROCEDURE — 84443 ASSAY THYROID STIM HORMONE: CPT

## 2024-10-28 PROCEDURE — 3008F BODY MASS INDEX DOCD: CPT | Performed by: FAMILY MEDICINE

## 2024-10-28 PROCEDURE — 3044F HG A1C LEVEL LT 7.0%: CPT | Performed by: FAMILY MEDICINE

## 2024-10-28 PROCEDURE — 3079F DIAST BP 80-89 MM HG: CPT | Performed by: FAMILY MEDICINE

## 2024-10-28 PROCEDURE — 99214 OFFICE O/P EST MOD 30 MIN: CPT | Performed by: FAMILY MEDICINE

## 2024-10-28 PROCEDURE — 3074F SYST BP LT 130 MM HG: CPT | Performed by: FAMILY MEDICINE

## 2024-10-28 PROCEDURE — 36415 COLL VENOUS BLD VENIPUNCTURE: CPT

## 2024-10-28 RX ORDER — UBROGEPANT 100 MG/1
TABLET ORAL
Qty: 10 TABLET | Refills: 0 | Status: SHIPPED | OUTPATIENT
Start: 2024-10-28

## 2024-12-19 ENCOUNTER — OFFICE VISIT (OUTPATIENT)
Dept: PRIMARY CARE | Facility: CLINIC | Age: 24
End: 2024-12-19
Payer: MEDICARE

## 2024-12-19 VITALS
WEIGHT: 217 LBS | BODY MASS INDEX: 37.05 KG/M2 | SYSTOLIC BLOOD PRESSURE: 123 MMHG | TEMPERATURE: 98.1 F | DIASTOLIC BLOOD PRESSURE: 83 MMHG | HEIGHT: 64 IN | HEART RATE: 101 BPM

## 2024-12-19 DIAGNOSIS — J20.9 ACUTE BRONCHITIS, UNSPECIFIED ORGANISM: Primary | ICD-10-CM

## 2024-12-19 PROCEDURE — 3079F DIAST BP 80-89 MM HG: CPT | Performed by: FAMILY MEDICINE

## 2024-12-19 PROCEDURE — 3008F BODY MASS INDEX DOCD: CPT | Performed by: FAMILY MEDICINE

## 2024-12-19 PROCEDURE — 99214 OFFICE O/P EST MOD 30 MIN: CPT | Performed by: FAMILY MEDICINE

## 2024-12-19 PROCEDURE — 3074F SYST BP LT 130 MM HG: CPT | Performed by: FAMILY MEDICINE

## 2024-12-19 PROCEDURE — 1036F TOBACCO NON-USER: CPT | Performed by: FAMILY MEDICINE

## 2024-12-19 PROCEDURE — 3044F HG A1C LEVEL LT 7.0%: CPT | Performed by: FAMILY MEDICINE

## 2024-12-19 RX ORDER — AZITHROMYCIN 250 MG/1
TABLET, FILM COATED ORAL
Qty: 6 TABLET | Refills: 0 | Status: SHIPPED | OUTPATIENT
Start: 2024-12-19 | End: 2024-12-24

## 2024-12-19 RX ORDER — PREDNISONE 20 MG/1
40 TABLET ORAL DAILY
Qty: 10 TABLET | Refills: 0 | Status: SHIPPED | OUTPATIENT
Start: 2024-12-19 | End: 2024-12-24

## 2024-12-19 NOTE — PROGRESS NOTES
"Subjective   Patient ID: May Richardson is a 24 y.o. female who presents for Sick Visit (Finished atb she was given for head congestion/sinus infection; yesterday she woke up with a wet cough, and SOB).    Acute sinusitis: Sx began around 2 weeks ago. Started with sinus infection. Completed amoxicilling. Now with wet cough in her chest. Chest congestion. Bringing up green sputum. Short of breath the last couple days. Nasal congestion has improved. +PND. No ear symptoms. Hoarseness. No sinus pressure. No vomiting or diarrhea. She has had pneumonia 4 times before. She was told not to take otc mucinex bc of her amitriptyline.     Objective   /83 (BP Location: Left arm, Patient Position: Sitting, BP Cuff Size: Large adult)   Pulse 101   Temp 36.7 °C (98.1 °F)   Ht 1.626 m (5' 4\")   Wt 98.4 kg (217 lb)   BMI 37.25 kg/m²     Gen: No acute distress, alert and oriented x3, pleasant   HEENT: moist mucous membranes, b/l external auditory canals are clear of debris, TMs within normal limits, no oropharyngeal lesions, eomi, perrla   Neck: thyroid within normal limits, no lymphadenopathy   CV: RRR, normal S1/S2, no murmur   Resp: Clear to auscultation bilaterally, no wheezes or rhonchi appreciated  Abd: soft, nontender, non-distended, no guarding/rigidity, bowel sounds present  Extr: no edema, no calf tenderness  Derm: Skin is warm and dry, no rashes appreciated  Psych: mood is good, affect is congruent, good hygiene, normal speech and eye contact  Neuro: cranial nerves grossly intact, normal gait    Assessment/Plan     #Acute bronchitis  Rx sent azithromycin  Fluids, rest, tylenol prn  Rx sent prednisone burst  "

## 2025-03-04 ENCOUNTER — APPOINTMENT (OUTPATIENT)
Dept: PRIMARY CARE | Facility: CLINIC | Age: 25
End: 2025-03-04
Payer: MEDICARE

## 2025-03-04 VITALS
BODY MASS INDEX: 36.81 KG/M2 | SYSTOLIC BLOOD PRESSURE: 136 MMHG | HEIGHT: 64 IN | DIASTOLIC BLOOD PRESSURE: 90 MMHG | WEIGHT: 215.6 LBS | HEART RATE: 114 BPM

## 2025-03-04 DIAGNOSIS — M54.16 LUMBAR RADICULOPATHY: ICD-10-CM

## 2025-03-04 DIAGNOSIS — R94.6 ABNORMAL THYROID FUNCTION TEST: Primary | ICD-10-CM

## 2025-03-04 PROCEDURE — 3075F SYST BP GE 130 - 139MM HG: CPT | Performed by: FAMILY MEDICINE

## 2025-03-04 PROCEDURE — 3008F BODY MASS INDEX DOCD: CPT | Performed by: FAMILY MEDICINE

## 2025-03-04 PROCEDURE — 3080F DIAST BP >= 90 MM HG: CPT | Performed by: FAMILY MEDICINE

## 2025-03-04 PROCEDURE — 99214 OFFICE O/P EST MOD 30 MIN: CPT | Performed by: FAMILY MEDICINE

## 2025-03-04 NOTE — PROGRESS NOTES
"Subjective   Patient ID: May Richardson is a 24 y.o. female who presents for Follow-up (Having some leg numbness when standing too long, sitting and laying in bed. She also is not feeling any different being on the levothyroxine. ).    HPI     Leg numbness: Happening intermittently. Was happening only when she is lying on her back but now hurting if she stands for long periods of time. No pain. They tingle, feels similar to when they fall asleep but more numb than that. Most intense in anterior thighs, hips down to knees. No sx in the hip or lower leg. Occasional back pain but that is chronic for her and unchanged. If she changes position or walks around it goes away.     Migraines: She is taking amitriptyline, HA reduced to one day per week. She is getting dry mouth but finds that tolerable. She took toradol, it helped but it took a long time to kick in and ROGERS returned when it wore off. Ubrelvy helped a lot, no SE's. She ended up using up the ubrelvy. She ended up getting ubrelvy through the website for free.     Hypothyroidism: On synthroid. Hasn't noticed any change in how she is feeling or her metabolism.      Abnormal weight gain: No irregular periods. She has gained about 30lb since her surgery last year.      Fhx colon cancer: Great grandmother had colon cancer and her grandmother was also recently diagnosed with colon cancer. She had C-scope in 2021 that was negative, unsure when next due.      All other systems have been reviewed and are negative for complaint       Objective   /90 (BP Location: Left arm)   Pulse (!) 114   Ht 1.626 m (5' 4\")   Wt 97.8 kg (215 lb 9.6 oz)   BMI 37.01 kg/m²     Gen: No acute distress, alert and oriented x3, pleasant   HEENT: moist mucous membranes, b/l external auditory canals are clear of debris, TMs within normal limits, no oropharyngeal lesions, eomi, perrla   Neck: thyroid within normal limits, no lymphadenopathy   CV: RRR, normal S1/S2, no murmur   Resp: " Clear to auscultation bilaterally, no wheezes or rhonchi appreciated  Abd: soft, nontender, non-distended, no guarding/rigidity, bowel sounds present  Extr: no edema, no calf tenderness  Derm: Skin is warm and dry, no rashes appreciated  Psych: mood is good, affect is congruent, good hygiene, normal speech and eye contact  Neuro: cranial nerves grossly intact, normal gait  MSK: Lumbar spine with decreased ROM in all planes due to pain and stiffness    Physical Exam    Assessment/Plan       #B/L leg numbness  Check lumbar spine xray    #Migraines  #Abnormal weight gain  Labs neg, MRI brain reinforces migraine dx  SE's on sumatriptan, rizatriptan, toradol, nurtec did not help  Doing reasonably well on amitriptyline  Ubrelvy was extremely helpful     #Hashimoto's thyroiditis:  #Hypothytroidism:  On replacement  Check TSH     HCM:  Last C-scope was 3y ago, Strong Fhx  Referring for C-scope today  Declining flu shot

## 2025-03-13 ENCOUNTER — HOSPITAL ENCOUNTER (OUTPATIENT)
Dept: RADIOLOGY | Facility: HOSPITAL | Age: 25
Discharge: HOME | End: 2025-03-13
Payer: MEDICARE

## 2025-03-13 DIAGNOSIS — M54.16 LUMBAR RADICULOPATHY: ICD-10-CM

## 2025-03-13 PROCEDURE — 72110 X-RAY EXAM L-2 SPINE 4/>VWS: CPT

## 2025-03-14 LAB
T3FREE SERPL-MCNC: 3.2 PG/ML (ref 2.3–4.2)
T4 FREE SERPL-MCNC: 0.9 NG/DL (ref 0.8–1.8)
TSH SERPL-ACNC: 2.33 MIU/L

## 2025-03-24 DIAGNOSIS — R94.6 ABNORMAL THYROID FUNCTION TEST: Primary | ICD-10-CM

## 2025-03-24 RX ORDER — LEVOTHYROXINE SODIUM 75 UG/1
75 TABLET ORAL DAILY
Qty: 30 TABLET | Refills: 11 | Status: SHIPPED | OUTPATIENT
Start: 2025-03-24 | End: 2026-03-24

## 2025-07-08 ENCOUNTER — APPOINTMENT (OUTPATIENT)
Dept: PRIMARY CARE | Facility: CLINIC | Age: 25
End: 2025-07-08
Payer: MEDICARE

## 2025-07-08 VITALS
SYSTOLIC BLOOD PRESSURE: 123 MMHG | DIASTOLIC BLOOD PRESSURE: 84 MMHG | HEART RATE: 86 BPM | HEIGHT: 64 IN | BODY MASS INDEX: 36.7 KG/M2 | WEIGHT: 215 LBS

## 2025-07-08 DIAGNOSIS — E11.9 TYPE 2 DIABETES MELLITUS WITHOUT COMPLICATION, WITHOUT LONG-TERM CURRENT USE OF INSULIN: Primary | ICD-10-CM

## 2025-07-08 DIAGNOSIS — G43.809 OTHER MIGRAINE WITHOUT STATUS MIGRAINOSUS, NOT INTRACTABLE: ICD-10-CM

## 2025-07-08 DIAGNOSIS — J44.9 CHRONIC OBSTRUCTIVE PULMONARY DISEASE, UNSPECIFIED COPD TYPE (MULTI): ICD-10-CM

## 2025-07-08 DIAGNOSIS — R94.6 ABNORMAL THYROID FUNCTION TEST: ICD-10-CM

## 2025-07-08 PROCEDURE — 3079F DIAST BP 80-89 MM HG: CPT | Performed by: FAMILY MEDICINE

## 2025-07-08 PROCEDURE — 3074F SYST BP LT 130 MM HG: CPT | Performed by: FAMILY MEDICINE

## 2025-07-08 PROCEDURE — 99214 OFFICE O/P EST MOD 30 MIN: CPT | Performed by: FAMILY MEDICINE

## 2025-07-08 PROCEDURE — 3008F BODY MASS INDEX DOCD: CPT | Performed by: FAMILY MEDICINE

## 2025-07-08 PROCEDURE — 1036F TOBACCO NON-USER: CPT | Performed by: FAMILY MEDICINE

## 2025-07-08 ASSESSMENT — PATIENT HEALTH QUESTIONNAIRE - PHQ9
SUM OF ALL RESPONSES TO PHQ9 QUESTIONS 1 AND 2: 0
2. FEELING DOWN, DEPRESSED OR HOPELESS: NOT AT ALL
1. LITTLE INTEREST OR PLEASURE IN DOING THINGS: NOT AT ALL

## 2025-07-08 NOTE — PROGRESS NOTES
"Subjective   Patient ID: May Richardson is a 25 y.o. female who presents for Follow-up (4 months).    Leg numbness: Happening intermittently. Was happening only when she is lying on her back but now hurting if she stands for long periods of time. No pain. They tingle, feels similar to when they fall asleep but more numb than that. Most intense in anterior thighs, hips down to knees. No sx in the hip or lower leg. Occasional back pain but that is chronic for her and unchanged. If she changes position or walks around it goes away.      Migraines: She stopped amitriptyline because she was concerned about weight gain. She hasn't lost or gained any weight but her HA are gone now. She only rarely needs the ubrelvy but that does help. She is still using tylenol first and that usually helps.      Hypothyroidism: On synthroid. Tried higher dose of synthroid starting back in levothyroxine. She is somewhat interested in trying a different medication besides synthroid.      Abnormal weight gain: No irregular periods. She has gained about 30lb since her surgery last year.      Fhx colon cancer: Great grandmother had colon cancer and her grandmother was also recently diagnosed with colon cancer. She had C-scope in 2021 that was negative, unsure when next due.      All other systems have been reviewed and are negative for complaint     Objective   /84 (BP Location: Left arm, Patient Position: Sitting, BP Cuff Size: Adult)   Pulse 86   Ht 1.626 m (5' 4\")   Wt 97.5 kg (215 lb)   BMI 36.90 kg/m²     Gen: No acute distress, alert and oriented x3, pleasant   HEENT: moist mucous membranes, b/l external auditory canals are clear of debris, TMs within normal limits, no oropharyngeal lesions, eomi, perrla   Neck: thyroid within normal limits, no lymphadenopathy   CV: RRR, normal S1/S2, no murmur   Resp: Clear to auscultation bilaterally, no wheezes or rhonchi appreciated  Abd: soft, nontender, non-distended, no " guarding/rigidity, bowel sounds present  Extr: no edema, no calf tenderness  Derm: Skin is warm and dry, no rashes appreciated  Psych: mood is good, affect is congruent, good hygiene, normal speech and eye contact  Neuro: cranial nerves grossly intact, normal gait      Assessment/Plan   #B/L leg numbness  Lumbar spine xray negative  Sx improving   Not interested in PT     #Migraines  #Abnormal weight gain  Labs neg, MRI brain reinforces migraine dx  SE's on sumatriptan, rizatriptan, toradol, nurtec did not help  Stopped amitriptyline  Doing well on ubrelvy prn     #Hashimoto's thyroiditis:  #Hypothytroidism:  On replacement  Check TSH     HCM:  Last C-scope was 3y ago, Strong Fhx  Referring for C-scope today  Declining flu shot

## 2025-07-09 DIAGNOSIS — G43.809 OTHER MIGRAINE WITHOUT STATUS MIGRAINOSUS, NOT INTRACTABLE: ICD-10-CM

## 2025-07-09 RX ORDER — UBROGEPANT 100 MG/1
TABLET ORAL
Qty: 10 TABLET | Refills: 1 | Status: SHIPPED | OUTPATIENT
Start: 2025-07-09

## 2025-07-11 DIAGNOSIS — E03.9 HYPOTHYROIDISM, UNSPECIFIED TYPE: Primary | ICD-10-CM

## 2025-07-11 LAB
ALBUMIN SERPL-MCNC: 4.4 G/DL (ref 3.6–5.1)
ALP SERPL-CCNC: 97 U/L (ref 31–125)
ALT SERPL-CCNC: 29 U/L (ref 6–29)
ANION GAP SERPL CALCULATED.4IONS-SCNC: 8 MMOL/L (CALC) (ref 7–17)
AST SERPL-CCNC: 20 U/L (ref 10–30)
BASOPHILS # BLD AUTO: 41 CELLS/UL (ref 0–200)
BASOPHILS NFR BLD AUTO: 0.5 %
BILIRUB SERPL-MCNC: 0.3 MG/DL (ref 0.2–1.2)
BUN SERPL-MCNC: 11 MG/DL (ref 7–25)
CALCIUM SERPL-MCNC: 9.1 MG/DL (ref 8.6–10.2)
CHLORIDE SERPL-SCNC: 105 MMOL/L (ref 98–110)
CO2 SERPL-SCNC: 26 MMOL/L (ref 20–32)
CREAT SERPL-MCNC: 0.79 MG/DL (ref 0.5–0.96)
EGFRCR SERPLBLD CKD-EPI 2021: 105 ML/MIN/1.73M2
EOSINOPHIL # BLD AUTO: 154 CELLS/UL (ref 15–500)
EOSINOPHIL NFR BLD AUTO: 1.9 %
ERYTHROCYTE [DISTWIDTH] IN BLOOD BY AUTOMATED COUNT: 13.3 % (ref 11–15)
EST. AVERAGE GLUCOSE BLD GHB EST-MCNC: 111 MG/DL
EST. AVERAGE GLUCOSE BLD GHB EST-SCNC: 6.2 MMOL/L
FSH SERPL-ACNC: 7.3 MIU/ML
GLUCOSE SERPL-MCNC: 90 MG/DL (ref 65–99)
HBA1C MFR BLD: 5.5 %
HCT VFR BLD AUTO: 42.8 % (ref 35–45)
HGB BLD-MCNC: 13.8 G/DL (ref 11.7–15.5)
LH SERPL-ACNC: 4.1 MIU/ML
LYMPHOCYTES # BLD AUTO: 2292 CELLS/UL (ref 850–3900)
LYMPHOCYTES NFR BLD AUTO: 28.3 %
MCH RBC QN AUTO: 31.4 PG (ref 27–33)
MCHC RBC AUTO-ENTMCNC: 32.2 G/DL (ref 32–36)
MCV RBC AUTO: 97.3 FL (ref 80–100)
MONOCYTES # BLD AUTO: 656 CELLS/UL (ref 200–950)
MONOCYTES NFR BLD AUTO: 8.1 %
NEUTROPHILS # BLD AUTO: 4957 CELLS/UL (ref 1500–7800)
NEUTROPHILS NFR BLD AUTO: 61.2 %
PLATELET # BLD AUTO: 310 THOUSAND/UL (ref 140–400)
PMV BLD REES-ECKER: 10.7 FL (ref 7.5–12.5)
POTASSIUM SERPL-SCNC: 4.4 MMOL/L (ref 3.5–5.3)
PROT SERPL-MCNC: 7 G/DL (ref 6.1–8.1)
RBC # BLD AUTO: 4.4 MILLION/UL (ref 3.8–5.1)
SODIUM SERPL-SCNC: 139 MMOL/L (ref 135–146)
T3FREE SERPL-MCNC: 3.2 PG/ML (ref 2.3–4.2)
T4 FREE SERPL-MCNC: 1.3 NG/DL (ref 0.8–1.8)
TSH SERPL-ACNC: 2.02 MIU/L
WBC # BLD AUTO: 8.1 THOUSAND/UL (ref 3.8–10.8)

## 2025-07-11 RX ORDER — THYROID 30 MG/1
30 TABLET ORAL DAILY
Qty: 30 TABLET | Refills: 2 | Status: SHIPPED | OUTPATIENT
Start: 2025-07-11 | End: 2025-10-09

## 2026-01-12 ENCOUNTER — APPOINTMENT (OUTPATIENT)
Dept: PRIMARY CARE | Facility: CLINIC | Age: 26
End: 2026-01-12
Payer: MEDICARE